# Patient Record
Sex: MALE | Race: BLACK OR AFRICAN AMERICAN
[De-identification: names, ages, dates, MRNs, and addresses within clinical notes are randomized per-mention and may not be internally consistent; named-entity substitution may affect disease eponyms.]

---

## 2019-07-20 ENCOUNTER — HOSPITAL ENCOUNTER (EMERGENCY)
Dept: HOSPITAL 53 - M ED | Age: 24
LOS: 1 days | Discharge: HOME | End: 2019-07-21
Payer: COMMERCIAL

## 2019-07-20 VITALS — BODY MASS INDEX: 23.48 KG/M2 | WEIGHT: 173.37 LBS | HEIGHT: 72 IN

## 2019-07-20 DIAGNOSIS — M62.82: Primary | ICD-10-CM

## 2019-07-20 PROCEDURE — 80076 HEPATIC FUNCTION PANEL: CPT

## 2019-07-20 PROCEDURE — 85027 COMPLETE CBC AUTOMATED: CPT

## 2019-07-20 PROCEDURE — 86617 LYME DISEASE ANTIBODY: CPT

## 2019-07-20 PROCEDURE — 96361 HYDRATE IV INFUSION ADD-ON: CPT

## 2019-07-20 PROCEDURE — 84484 ASSAY OF TROPONIN QUANT: CPT

## 2019-07-20 PROCEDURE — 96374 THER/PROPH/DIAG INJ IV PUSH: CPT

## 2019-07-20 PROCEDURE — 80047 BASIC METABLC PNL IONIZED CA: CPT

## 2019-07-20 PROCEDURE — 99284 EMERGENCY DEPT VISIT MOD MDM: CPT

## 2019-07-20 PROCEDURE — 93005 ELECTROCARDIOGRAM TRACING: CPT

## 2019-07-20 PROCEDURE — 82553 CREATINE MB FRACTION: CPT

## 2019-07-20 PROCEDURE — 82550 ASSAY OF CK (CPK): CPT

## 2019-07-21 VITALS — DIASTOLIC BLOOD PRESSURE: 71 MMHG | SYSTOLIC BLOOD PRESSURE: 123 MMHG

## 2019-07-21 LAB
ALBUMIN SERPL BCG-MCNC: 4.1 GM/DL (ref 3.2–5.2)
ALT SERPL W P-5'-P-CCNC: 57 U/L (ref 12–78)
BILIRUB CONJ SERPL-MCNC: 0.4 MG/DL (ref 0–0.2)
BILIRUB SERPL-MCNC: 1.9 MG/DL (ref 0.2–1)
CK MB CFR.DF SERPL CALC: 0.15
CK MB SERPL-MCNC: 3.4 NG/ML (ref ?–3.6)
CK SERPL-CCNC: 2267 U/L (ref 39–308)
HCT VFR BLD AUTO: 41.5 % (ref 42–52)
HGB BLD-MCNC: 15 G/DL (ref 13.5–17.5)
MCH RBC QN AUTO: 29.6 PG (ref 27–33)
MCHC RBC AUTO-ENTMCNC: 36.1 G/DL (ref 32–36.5)
MCV RBC AUTO: 81.9 FL (ref 80–96)
PLATELET # BLD AUTO: 251 10^3/UL (ref 150–450)
PROT SERPL-MCNC: 7.4 GM/DL (ref 6.4–8.2)
RBC # BLD AUTO: 5.07 10^6/UL (ref 4.3–6.1)
TROPONIN I SERPL-MCNC: < 0.02 NG/ML (ref ?–0.1)
WBC # BLD AUTO: 9.2 10^3/UL (ref 4–10)

## 2019-07-21 NOTE — ECGEPIP
[facility] - ED

                                       

                                       Test Date:    2019

Pat Name:     PHILL FAIRBANKS              Department:   

Patient ID:   [id number]                 Room:         -

Gender:       Male                     Technician:   GABY

:          1995               Requested By: GARETH FAULKNER 

Order Number: [id number]     Reading MD:   Maja Lundborg-Gray

                                 Measurements

Intervals                              Axis          

Rate:         72                       P:            75

CT:           198                      QRS:          76

QRSD:         89                       T:            67

QT:           360                                    

QTc:          395                                    

                           Interpretive Statements

SINUS RHYTHM WITH SINUS ARRHYTHMIA

POSSIBLE BRUGADA TYPE 2

 

CLINICAL CORRELATION ADVISED

 

NO PRIOR ECG FOR COMPARISON

Electronically Signed on 2019 6:37:23 EDT by Maja Lundborg-Gray

## 2019-07-22 NOTE — ED PDOC
Post-Departure Follow-Up


ecg reading faxed to [facility] for fu mlg Lundborg-Gray,Maja MD          Jul 22, 2019 06:35

## 2019-07-24 LAB
B BURGDOR IGG+IGM SER-ACNC: <0.91 ISR (ref 0–0.9)
B BURGDOR IGM SER IA-ACNC: 1.21 INDEX (ref 0–0.79)

## 2021-10-07 ENCOUNTER — HOSPITAL ENCOUNTER (EMERGENCY)
Dept: HOSPITAL 53 - M ED | Age: 26
Discharge: HOME | End: 2021-10-07
Payer: COMMERCIAL

## 2021-10-07 VITALS — SYSTOLIC BLOOD PRESSURE: 137 MMHG | DIASTOLIC BLOOD PRESSURE: 66 MMHG

## 2021-10-07 VITALS — BODY MASS INDEX: 23.96 KG/M2 | HEIGHT: 73 IN | WEIGHT: 180.78 LBS

## 2021-10-07 DIAGNOSIS — T50.B95A: ICD-10-CM

## 2021-10-07 DIAGNOSIS — R05.9: ICD-10-CM

## 2021-10-07 DIAGNOSIS — M79.10: ICD-10-CM

## 2021-10-07 DIAGNOSIS — R59.0: Primary | ICD-10-CM

## 2021-10-07 NOTE — REP
INDICATION:

hemoptysis, cough



COMPARISON:

None.



TECHNIQUE:

PA and lateral.



FINDINGS:

The mediastinum and cardiac silhouette are normal.  The lung fields are clear and

without acute consolidation, effusion, or pneumothorax.  The skeletal structures are

intact and normal.



IMPRESSION:

No acute cardiopulmonary process.





<Electronically signed by Vinicio Mata > 10/07/21 1105